# Patient Record
Sex: MALE | Race: OTHER
[De-identification: names, ages, dates, MRNs, and addresses within clinical notes are randomized per-mention and may not be internally consistent; named-entity substitution may affect disease eponyms.]

---

## 2019-01-02 PROBLEM — Z00.00 ENCOUNTER FOR PREVENTIVE HEALTH EXAMINATION: Status: ACTIVE | Noted: 2019-01-02

## 2019-01-22 ENCOUNTER — APPOINTMENT (OUTPATIENT)
Dept: PULMONOLOGY | Facility: CLINIC | Age: 54
End: 2019-01-22

## 2022-07-22 ENCOUNTER — NON-APPOINTMENT (OUTPATIENT)
Age: 57
End: 2022-07-22

## 2022-07-25 ENCOUNTER — NON-APPOINTMENT (OUTPATIENT)
Age: 57
End: 2022-07-25

## 2022-07-25 ENCOUNTER — APPOINTMENT (OUTPATIENT)
Dept: NEUROLOGY | Facility: CLINIC | Age: 57
End: 2022-07-25

## 2022-07-27 NOTE — DISCUSSION/SUMMARY
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 Perhaps the temporal one could be resected.\par \par HYDROCEPHALUS -- He may benefit from VPS placement.

## 2022-07-27 NOTE — HISTORY OF PRESENT ILLNESS
[FreeTextEntry1] : TEXT BELOW PREPARED IN ANTICIPATION OF VISIT 7/25/2022\par \par 58 yo man with PMHx of lung cancer metastatic to right frontal lobe at diagnosis in NOVEMBER 2019 when he presented with increased ICP and personality changes. He is s/p resection (BRISMAN) 11/11/19, had new intracranial, durally-based lesions in JULY 2020 and has received intra-Ommaya cytarabine (ELIDA) in 2022.\par \par ONCOLOGIC HISTORY\par presented to ED with severe headaches, nausea, vomiting, and personality changes.\par 11/7/19 - MR brain ([facility]) - two large heterogeneously enhancing R frontal masses (4cm) + cerebral edema\par 11/11/19 - operative report for right frontal crani\par PATH: metastatic poorly differentiated adenocarcinoma, lung favored, TTF-1 and CK7 positive.\par 11/12/19 - MR brain ([facility]) - s/p R frontal craniotomy with post op changes + no hydro\par 11/16/19 - MRI LS spine ([facility]) - "PMHx lung cancer" no lepto enhancement, no overt mets\par 1/13/20 - MRI ([facility]) - s/p R frontal craniotomy with post op changes.\par 7/21/20 - MRI brain ([facility]) - s/p R frontal crani + new dural-based right frontal lesions (2.8cm) with edema.\par 5/4/22 - s/p IT cytarabine (ELIDA) 100mg\par CSF: PROTEIN=35 (Ventricular), WBC=1, cytology negative\par 6/14/22 - PET "metastatic lung cancer" new right temporal avid lesion, left supraclavicular lymph nodes, MONI mass, SUV 17.6 (increased from 12)

## 2022-07-27 NOTE — DATA REVIEWED
[de-identified] : \par I personally reviewed MR imaging dated\par 11/7/2019	11/12/2019	1/7/2020	1/13/2020\par 6/18/2021	5/26/2022	7/21/2020	2/11/2021\par 3/11/2021			\par \par In reviewing these images, I find There are initially two large right frontal intracranial metastases that extend to the dura from the brain parenchyma or perhaps into the brain parenchyma from the dural. In either case, they are large and are both resected on the 11/12/2019 imaging.\par Over time, there is marked recurrence, that appears durally-based, involving the right frontal convexity dura and extending down to involve the right temporal lobe anterior dura as well. There is deep parenchymal extension of enhancement to the right frontal horn.\par The most recent imaging study, dated 5/26/2022 comes almost a full year after the penultimate image dated 6/18/2021 and demonstrates both persistent right frontal enhancement that may represent active or quiescent disease and what appears to be a new right temporal, durally-based, focus of enhancement. \par There is copious white matter hyperintensity on the T2 weighted images, with signal change likely representing an admixture of treatment effects and cerebral edema. The pattern is atypical for post-whole brain radiotherapy and likely reflects localized treatment effects from surgery and/of focal radiotherapy. More concerningly, there is T2 hyperintensity at the frontal and occipital horns, associated with ventriculomegaly and most consistent with hydrocephalus. Given motion artifact on later (but not earlier) imaging, likely symptomatic hydrocephalus. \par DWI sequences disclose no restriction.\par Overall I find the images to be c/w progressive neoplasm and hydrocephalus. \par

## 2022-07-28 ENCOUNTER — APPOINTMENT (OUTPATIENT)
Dept: NEUROLOGY | Facility: CLINIC | Age: 57
End: 2022-07-28

## 2022-08-01 ENCOUNTER — NON-APPOINTMENT (OUTPATIENT)
Age: 57
End: 2022-08-01